# Patient Record
Sex: MALE | Race: WHITE | NOT HISPANIC OR LATINO | Employment: OTHER | URBAN - METROPOLITAN AREA
[De-identification: names, ages, dates, MRNs, and addresses within clinical notes are randomized per-mention and may not be internally consistent; named-entity substitution may affect disease eponyms.]

---

## 2019-04-18 ENCOUNTER — NEW PATIENT COMPREHENSIVE (OUTPATIENT)
Dept: URBAN - METROPOLITAN AREA CLINIC 36 | Facility: CLINIC | Age: 74
End: 2019-04-18

## 2019-04-18 DIAGNOSIS — H02.831: ICD-10-CM

## 2019-04-18 DIAGNOSIS — H52.7: ICD-10-CM

## 2019-04-18 DIAGNOSIS — H02.834: ICD-10-CM

## 2019-04-18 PROCEDURE — 92004 COMPRE OPH EXAM NEW PT 1/>: CPT

## 2019-04-18 PROCEDURE — 92015 DETERMINE REFRACTIVE STATE: CPT

## 2019-04-18 ASSESSMENT — TONOMETRY
OS_IOP_MMHG: 15
OD_IOP_MMHG: 15
OD_IOP_MMHG: 16
OS_IOP_MMHG: 16

## 2019-04-18 ASSESSMENT — VISUAL ACUITY
OD_CC: J2+1
OS_SC: J12
OS_SC: 20/20-2
OD_SC: 20/20-2
OS_CC: J1
OD_SC: J10-1

## 2020-10-15 ENCOUNTER — ESTABLISHED COMPREHENSIVE EXAM (OUTPATIENT)
Dept: URBAN - METROPOLITAN AREA CLINIC 36 | Facility: CLINIC | Age: 75
End: 2020-10-15

## 2020-10-15 DIAGNOSIS — H52.7: ICD-10-CM

## 2020-10-15 DIAGNOSIS — H02.831: ICD-10-CM

## 2020-10-15 DIAGNOSIS — H04.123: ICD-10-CM

## 2020-10-15 DIAGNOSIS — H02.834: ICD-10-CM

## 2020-10-15 PROCEDURE — 92015 DETERMINE REFRACTIVE STATE: CPT

## 2020-10-15 PROCEDURE — 92014 COMPRE OPH EXAM EST PT 1/>: CPT

## 2020-10-15 ASSESSMENT — VISUAL ACUITY
OS_CC: J1
OS_SC: 20/20-2
OD_SC: J10
OD_SC: 20/25
OS_SC: J8
OD_CC: J1

## 2020-10-15 ASSESSMENT — TONOMETRY
OS_IOP_MMHG: 12
OD_IOP_MMHG: 14

## 2021-09-10 NOTE — PATIENT DISCUSSION
Consult with Dr. Amanda Antoine for Lasik.  Patient informed on [facility].

## 2021-09-10 NOTE — PATIENT DISCUSSION
Consult with Dr. Francisca Flores for Lasik.  Patient informed on [facility].

## 2021-10-18 ENCOUNTER — ESTABLISHED COMPREHENSIVE EXAM (OUTPATIENT)
Dept: URBAN - METROPOLITAN AREA CLINIC 36 | Facility: CLINIC | Age: 76
End: 2021-10-18

## 2021-10-18 DIAGNOSIS — Z96.1: ICD-10-CM

## 2021-10-18 DIAGNOSIS — H04.123: ICD-10-CM

## 2021-10-18 DIAGNOSIS — H52.7: ICD-10-CM

## 2021-10-18 DIAGNOSIS — H02.831: ICD-10-CM

## 2021-10-18 DIAGNOSIS — H43.393: ICD-10-CM

## 2021-10-18 PROCEDURE — 92015 DETERMINE REFRACTIVE STATE: CPT

## 2021-10-18 PROCEDURE — 92014 COMPRE OPH EXAM EST PT 1/>: CPT

## 2021-10-18 ASSESSMENT — TONOMETRY
OS_IOP_MMHG: 14
OD_IOP_MMHG: 14

## 2021-10-18 ASSESSMENT — VISUAL ACUITY
OD_CC: J1
OS_SC: J10
OS_SC: 20/25
OD_SC: J10-
OD_SC: 20/25-1
OS_CC: J1+

## 2021-10-21 ENCOUNTER — ESTABLISHED PATIENT (OUTPATIENT)
Dept: URBAN - METROPOLITAN AREA CLINIC 36 | Facility: CLINIC | Age: 76
End: 2021-10-21

## 2021-10-21 DIAGNOSIS — H57.813: ICD-10-CM

## 2021-10-21 DIAGNOSIS — H02.835: ICD-10-CM

## 2021-10-21 DIAGNOSIS — H02.831: ICD-10-CM

## 2021-10-21 DIAGNOSIS — H02.834: ICD-10-CM

## 2021-10-21 DIAGNOSIS — H02.832: ICD-10-CM

## 2021-10-21 PROCEDURE — 99213 OFFICE O/P EST LOW 20 MIN: CPT

## 2021-10-21 ASSESSMENT — VISUAL ACUITY
OD_SC: 20/30+1
OS_SC: 20/30+2

## 2021-11-05 ENCOUNTER — TECH ONLY (OUTPATIENT)
Dept: URBAN - METROPOLITAN AREA CLINIC 36 | Facility: CLINIC | Age: 76
End: 2021-11-05

## 2021-11-05 DIAGNOSIS — H02.832: ICD-10-CM

## 2021-11-05 DIAGNOSIS — H02.835: ICD-10-CM

## 2021-11-05 DIAGNOSIS — H02.831: ICD-10-CM

## 2021-11-05 DIAGNOSIS — H02.834: ICD-10-CM

## 2021-11-05 DIAGNOSIS — H57.813: ICD-10-CM

## 2021-11-05 PROCEDURE — 99211T TECH SERVICE

## 2021-11-05 PROCEDURE — 92082 INTERMEDIATE VISUAL FIELD XM: CPT

## 2022-01-13 ENCOUNTER — PRE-OP/H&P (OUTPATIENT)
Dept: URBAN - METROPOLITAN AREA CLINIC 36 | Facility: CLINIC | Age: 77
End: 2022-01-13

## 2022-01-13 DIAGNOSIS — H02.832: ICD-10-CM

## 2022-01-13 DIAGNOSIS — H02.834: ICD-10-CM

## 2022-01-13 DIAGNOSIS — H02.831: ICD-10-CM

## 2022-01-13 DIAGNOSIS — H02.835: ICD-10-CM

## 2022-01-13 DIAGNOSIS — H52.7: ICD-10-CM

## 2022-01-13 DIAGNOSIS — H57.813: ICD-10-CM

## 2022-01-13 PROCEDURE — 99211HP H&P OFFICE/OUTPATIENT VISIT, EST

## 2022-01-13 RX ORDER — ERYTHROMYCIN 5 MG/G
OINTMENT OPHTHALMIC
Start: 2022-01-17

## 2022-01-13 RX ORDER — TRAMADOL HCL 50 MG/1
1 TABLET ORAL
Start: 2022-01-17

## 2022-01-17 ENCOUNTER — SURGERY/PROCEDURE (OUTPATIENT)
Dept: URBAN - METROPOLITAN AREA SURGERY 14 | Facility: SURGERY | Age: 77
End: 2022-01-17

## 2022-01-17 ENCOUNTER — PRE-OP/H&P (OUTPATIENT)
Dept: URBAN - METROPOLITAN AREA SURGERY 14 | Facility: SURGERY | Age: 77
End: 2022-01-17

## 2022-01-17 DIAGNOSIS — H02.831: ICD-10-CM

## 2022-01-17 DIAGNOSIS — H52.7: ICD-10-CM

## 2022-01-17 DIAGNOSIS — H57.813: ICD-10-CM

## 2022-01-17 DIAGNOSIS — H02.834: ICD-10-CM

## 2022-01-17 DIAGNOSIS — H02.832: ICD-10-CM

## 2022-01-17 DIAGNOSIS — H02.835: ICD-10-CM

## 2022-01-17 PROCEDURE — 99211T TECH SERVICE

## 2022-01-17 PROCEDURE — 1582350 UPPER BLEPH PER EYE FUNCTIONAL-BILATERAL

## 2022-01-27 ENCOUNTER — POST-OP (OUTPATIENT)
Dept: URBAN - METROPOLITAN AREA CLINIC 36 | Facility: CLINIC | Age: 77
End: 2022-01-27

## 2022-01-27 DIAGNOSIS — Z98.890: ICD-10-CM

## 2022-01-27 DIAGNOSIS — H02.835: ICD-10-CM

## 2022-01-27 DIAGNOSIS — H02.832: ICD-10-CM

## 2022-01-27 DIAGNOSIS — H02.831: ICD-10-CM

## 2022-01-27 DIAGNOSIS — H52.7: ICD-10-CM

## 2022-01-27 DIAGNOSIS — H02.834: ICD-10-CM

## 2022-01-27 PROCEDURE — 92285 EXTERNAL OCULAR PHOTOGRAPHY: CPT

## 2022-01-27 PROCEDURE — 99024 POSTOP FOLLOW-UP VISIT: CPT

## 2022-01-27 ASSESSMENT — VISUAL ACUITY
OS_CC: 20/40
OD_CC: 20/40-2

## 2023-01-16 ENCOUNTER — COMPREHENSIVE EXAM (OUTPATIENT)
Dept: URBAN - METROPOLITAN AREA CLINIC 36 | Facility: CLINIC | Age: 78
End: 2023-01-16

## 2023-01-16 DIAGNOSIS — H02.834: ICD-10-CM

## 2023-01-16 DIAGNOSIS — H04.123: ICD-10-CM

## 2023-01-16 DIAGNOSIS — H52.7: ICD-10-CM

## 2023-01-16 DIAGNOSIS — H02.831: ICD-10-CM

## 2023-01-16 PROCEDURE — 92015 DETERMINE REFRACTIVE STATE: CPT

## 2023-01-16 PROCEDURE — 92014 COMPRE OPH EXAM EST PT 1/>: CPT

## 2023-01-16 ASSESSMENT — VISUAL ACUITY
OD_CC: J1+
OD_SC: J8
OD_SC: 20/25-1
OS_CC: 20/20-2
OS_SC: J5
OD_CC: 20/30-2
OS_CC: J1+
OS_SC: 20/25

## 2023-01-16 ASSESSMENT — TONOMETRY
OD_IOP_MMHG: 15
OS_IOP_MMHG: 15

## 2024-01-30 ENCOUNTER — COMPREHENSIVE EXAM (OUTPATIENT)
Dept: URBAN - METROPOLITAN AREA CLINIC 36 | Facility: CLINIC | Age: 79
End: 2024-01-30

## 2024-01-30 DIAGNOSIS — H04.123: ICD-10-CM

## 2024-01-30 DIAGNOSIS — Z96.1: ICD-10-CM

## 2024-01-30 DIAGNOSIS — E11.9: ICD-10-CM

## 2024-01-30 DIAGNOSIS — H43.393: ICD-10-CM

## 2024-01-30 DIAGNOSIS — H52.7: ICD-10-CM

## 2024-01-30 PROCEDURE — 92014 COMPRE OPH EXAM EST PT 1/>: CPT

## 2024-01-30 PROCEDURE — 92015 DETERMINE REFRACTIVE STATE: CPT

## 2024-01-30 ASSESSMENT — VISUAL ACUITY
OD_CC: 20/25-1
OS_CC: 20/20-1
OS_CC: J2
OD_CC: J1

## 2024-01-30 ASSESSMENT — TONOMETRY
OD_IOP_MMHG: 15
OS_IOP_MMHG: 16

## 2025-01-30 ENCOUNTER — COMPREHENSIVE EXAM (OUTPATIENT)
Age: 80
End: 2025-01-30

## 2025-01-30 DIAGNOSIS — H02.835: ICD-10-CM

## 2025-01-30 DIAGNOSIS — H02.832: ICD-10-CM

## 2025-01-30 DIAGNOSIS — E11.9: ICD-10-CM

## 2025-01-30 DIAGNOSIS — H04.123: ICD-10-CM

## 2025-01-30 PROCEDURE — 99213 OFFICE O/P EST LOW 20 MIN: CPT
